# Patient Record
Sex: MALE | Race: WHITE | NOT HISPANIC OR LATINO
[De-identification: names, ages, dates, MRNs, and addresses within clinical notes are randomized per-mention and may not be internally consistent; named-entity substitution may affect disease eponyms.]

---

## 2021-11-03 ENCOUNTER — APPOINTMENT (OUTPATIENT)
Dept: SURGERY | Facility: CLINIC | Age: 51
End: 2021-11-03
Payer: COMMERCIAL

## 2021-11-03 VITALS
OXYGEN SATURATION: 97 % | BODY MASS INDEX: 31.21 KG/M2 | WEIGHT: 194.22 LBS | SYSTOLIC BLOOD PRESSURE: 128 MMHG | HEART RATE: 94 BPM | DIASTOLIC BLOOD PRESSURE: 78 MMHG | TEMPERATURE: 96.7 F | HEIGHT: 66 IN

## 2021-11-03 DIAGNOSIS — Z87.19 PERSONAL HISTORY OF OTHER DISEASES OF THE DIGESTIVE SYSTEM: ICD-10-CM

## 2021-11-03 DIAGNOSIS — G47.33 OBSTRUCTIVE SLEEP APNEA (ADULT) (PEDIATRIC): ICD-10-CM

## 2021-11-03 DIAGNOSIS — Z78.9 OTHER SPECIFIED HEALTH STATUS: ICD-10-CM

## 2021-11-03 DIAGNOSIS — Z99.89 OBSTRUCTIVE SLEEP APNEA (ADULT) (PEDIATRIC): ICD-10-CM

## 2021-11-03 DIAGNOSIS — Z86.39 PERSONAL HISTORY OF OTHER ENDOCRINE, NUTRITIONAL AND METABOLIC DISEASE: ICD-10-CM

## 2021-11-03 DIAGNOSIS — Z82.49 FAMILY HISTORY OF ISCHEMIC HEART DISEASE AND OTHER DISEASES OF THE CIRCULATORY SYSTEM: ICD-10-CM

## 2021-11-03 DIAGNOSIS — Z87.09 PERSONAL HISTORY OF OTHER DISEASES OF THE RESPIRATORY SYSTEM: ICD-10-CM

## 2021-11-03 PROCEDURE — 99204 OFFICE O/P NEW MOD 45 MIN: CPT

## 2021-11-03 RX ORDER — FLUTICASONE FUROATE, UMECLIDINIUM BROMIDE AND VILANTEROL TRIFENATATE 200; 62.5; 25 UG/1; UG/1; UG/1
POWDER RESPIRATORY (INHALATION)
Refills: 0 | Status: ACTIVE | COMMUNITY

## 2021-11-03 RX ORDER — METFORMIN HYDROCHLORIDE 625 MG/1
TABLET ORAL
Refills: 0 | Status: ACTIVE | COMMUNITY

## 2021-11-10 NOTE — HISTORY OF PRESENT ILLNESS
[de-identified] : Very pleasant gentleman who presents for General Surgery consultation regarding a longstanding, but slowly progressive neck mass.\par Mr. Javed report a first palpable, then visible fullness along the posterior aspect of his neck some three to four years ago.\par He denies sakshi pain today, but notes an ongoing sensation of "awareness" and pressure.\par With time the mass has progressed from the more central aspect of the posterior neck toward the right posterior triangle.\par He has been previously told that this is "likely a lipoma," but has not had any imaging or investigations to date regarding this issue...
Myself

## 2021-11-10 NOTE — PLAN
[FreeTextEntry1] : Grossly benign appearing but progressive and now large soft tissue lesion.  No imaging to date.  Overall clinical impression is of lipoma versus cyst.  However, extending into posterior triangle/ region of Spinal Accessory nerve.\par Would recommend ultrasound to evaluate for cystic collection or lesion versus lipoma.  Given extension into deeper SQ tissue planes, can consider MRI to further delineate underlying anatomy and any extension into/ proximity to more critical structures.  All above discussed with patient in detail.  Given these considerations and the non-emergent  nature of this intervention, will refer to Surgical Oncology.  Patient given name and contact information for provider within system.  I remain available for any interval questions or difficulty in securing timely follow-up.  Mr. Javed is pleased and agrees.  He leaves in good spirits and is able to verbalize this issues and recommendation as outlined above.  Please note that more than 50% of face to face time was spent in counseling and coordination of care.

## 2021-11-10 NOTE — PHYSICAL EXAM
[Respiratory Effort] : normal respiratory effort [Normal Rate and Rhythm] : normal rate and rhythm [No HSM] : no hepatosplenomegaly [Tender] : was nontender [Enlarged] : not enlarged [Alert] : alert [Oriented to Person] : oriented to person [Oriented to Place] : oriented to place [Oriented to Time] : oriented to time [Anxious] : anxious [de-identified] : Appears well, no acute distress, ambulates easily into office and assumes examination table without need of assistance.  [de-identified] : Normocephalic and atraumatic.  [de-identified] : Supple with full range of motion; see skin exam. [FreeTextEntry1] : No cervical, supraclavicular, axillary or inguinal adenopathy.  [de-identified] : No visible lesions or palpable masses.  [de-identified] : Soft, non tense and non tender. [de-identified] : Normal external genitalia. [de-identified] : Deferred.  [de-identified] : Grossly symmetric and within normal limits without motor or sensory deficits.  [de-identified] : Soft, rubbery, but not fully mobile soft tissue mass ~ 8 cm grossly along posterior cervical region extending onto/ into right posterior triangle.  Suggestive grossly of lipoma versus cyst, but not fully able to elevate from deeper SQ/ soft tissue planes. [de-identified] : though appropriately so...

## 2021-11-10 NOTE — REVIEW OF SYSTEMS
[Feeling Poorly] : not feeling poorly [Feeling Tired] : not feeling tired [As Noted in HPI] : as noted in HPI [Anxiety] : anxiety [Depression] : no depression [Negative] : Heme/Lymph [de-identified] : regarding this issue and visit...

## 2021-11-15 ENCOUNTER — NON-APPOINTMENT (OUTPATIENT)
Age: 51
End: 2021-11-15

## 2021-11-18 ENCOUNTER — APPOINTMENT (OUTPATIENT)
Dept: SURGICAL ONCOLOGY | Facility: CLINIC | Age: 51
End: 2021-11-18
Payer: COMMERCIAL

## 2021-11-18 ENCOUNTER — NON-APPOINTMENT (OUTPATIENT)
Age: 51
End: 2021-11-18

## 2021-11-18 VITALS
HEIGHT: 66 IN | BODY MASS INDEX: 30.7 KG/M2 | SYSTOLIC BLOOD PRESSURE: 139 MMHG | RESPIRATION RATE: 15 BRPM | WEIGHT: 191 LBS | TEMPERATURE: 97.8 F | OXYGEN SATURATION: 98 % | DIASTOLIC BLOOD PRESSURE: 85 MMHG | HEART RATE: 86 BPM

## 2021-11-18 PROCEDURE — 99204 OFFICE O/P NEW MOD 45 MIN: CPT

## 2021-11-18 RX ORDER — DIAZEPAM 5 MG/1
5 TABLET ORAL
Qty: 3 | Refills: 0 | Status: ACTIVE | COMMUNITY
Start: 2021-11-18 | End: 1900-01-01

## 2021-11-18 NOTE — HISTORY OF PRESENT ILLNESS
[de-identified] : Juno is a pleasant 51 year-old male here for an initial consultation.  He reports noting fullness along the right posterior aspect of his neck approximately 3-4 years ago.  He believes he had imaging for this in the past with no worrisome findings.  He reports associated pressure but no pain.  Over time the mass seems to have enlarged prompting him to seek medical advice.  He also reports noting a small soft mass on his anterior abdominal wall, also approximately 3 years ago with no interval growth.  \par \par His past medical history includes metformin, asthma (controlled), hyperlipidemia, GERD, IVANA (CPAP).  Past surgical history includes repair of an inguinal hernia.  He has a family history of colon cancer involving her maternal uncle.   He recently had a screening colonoscopy notable for polyps.  He denies tobacco use and drinks alcohol on occasion. \par \par He states that he is prone to developing fatty tumors and had 2 tumors removed when he was younger. \par

## 2021-11-18 NOTE — ASSESSMENT
[FreeTextEntry1] : We discussed options for the management of soft tissue masses.  I discussed the need for an MRI of the soft tissue of the skull.  Teofilo is concerned about claustrophobia, but is willing to try undergoing the MRI with anxiolytics.  He will contact me upon completion of the imaging to discuss pertinent findings and plan for surgical resection of the soft tissue mass.  The abdominal mass may be a another lipoma or a central hernia with preperitoneal fat.  Juno is in agreement with further evaluation of that in the future.

## 2021-11-18 NOTE — CONSULT LETTER
[DrAshley  ___] : Dr. TANG [Dear  ___] : Dear ~CHANDLER, [Consult Letter:] : I had the pleasure of evaluating your patient, [unfilled]. [Please see my note below.] : Please see my note below. [Consult Closing:] : Thank you very much for allowing me to participate in the care of this patient.  If you have any questions, please do not hesitate to contact me. [Sincerely,] : Sincerely, [FreeTextEntry2] : Vitaly Tinoco MD [FreeTextEntry3] : Ramiro Celestin MD\par Surgical Oncology\par [facility]\par Office: [phone]\par Cell: [phone]\par

## 2022-05-28 ENCOUNTER — EMERGENCY (EMERGENCY)
Facility: HOSPITAL | Age: 52
LOS: 1 days | Discharge: HOME | End: 2022-05-28
Attending: EMERGENCY MEDICINE
Payer: COMMERCIAL

## 2022-05-28 VITALS
DIASTOLIC BLOOD PRESSURE: 105 MMHG | SYSTOLIC BLOOD PRESSURE: 174 MMHG | HEART RATE: 105 BPM | TEMPERATURE: 98 F | WEIGHT: 184.97 LBS | HEIGHT: 66 IN | RESPIRATION RATE: 18 BRPM | OXYGEN SATURATION: 99 %

## 2022-05-28 LAB
ALBUMIN SERPL ELPH-MCNC: 5 G/DL — SIGNIFICANT CHANGE UP (ref 3.3–5)
ALP SERPL-CCNC: 88 U/L — SIGNIFICANT CHANGE UP (ref 40–120)
ALT FLD-CCNC: 45 U/L — SIGNIFICANT CHANGE UP (ref 10–45)
ANION GAP SERPL CALC-SCNC: 14 MMOL/L — SIGNIFICANT CHANGE UP (ref 5–17)
AST SERPL-CCNC: 25 U/L — SIGNIFICANT CHANGE UP (ref 10–40)
BASOPHILS # BLD AUTO: 0.04 K/UL — SIGNIFICANT CHANGE UP (ref 0–0.2)
BASOPHILS NFR BLD AUTO: 0.4 % — SIGNIFICANT CHANGE UP (ref 0–2)
BILIRUB SERPL-MCNC: 0.6 MG/DL — SIGNIFICANT CHANGE UP (ref 0.2–1.2)
BUN SERPL-MCNC: 13 MG/DL — SIGNIFICANT CHANGE UP (ref 7–23)
CALCIUM SERPL-MCNC: 9.9 MG/DL — SIGNIFICANT CHANGE UP (ref 8.4–10.5)
CHLORIDE SERPL-SCNC: 103 MMOL/L — SIGNIFICANT CHANGE UP (ref 96–108)
CO2 SERPL-SCNC: 26 MMOL/L — SIGNIFICANT CHANGE UP (ref 22–31)
CREAT SERPL-MCNC: 0.8 MG/DL — SIGNIFICANT CHANGE UP (ref 0.5–1.3)
EGFR: 106 ML/MIN/1.73M2 — SIGNIFICANT CHANGE UP
EOSINOPHIL # BLD AUTO: 0.09 K/UL — SIGNIFICANT CHANGE UP (ref 0–0.5)
EOSINOPHIL NFR BLD AUTO: 0.8 % — SIGNIFICANT CHANGE UP (ref 0–6)
GLUCOSE SERPL-MCNC: 137 MG/DL — HIGH (ref 70–99)
HCT VFR BLD CALC: 44.5 % — SIGNIFICANT CHANGE UP (ref 39–50)
HGB BLD-MCNC: 14.5 G/DL — SIGNIFICANT CHANGE UP (ref 13–17)
IMM GRANULOCYTES NFR BLD AUTO: 0.9 % — SIGNIFICANT CHANGE UP (ref 0–1.5)
LYMPHOCYTES # BLD AUTO: 1.92 K/UL — SIGNIFICANT CHANGE UP (ref 1–3.3)
LYMPHOCYTES # BLD AUTO: 18 % — SIGNIFICANT CHANGE UP (ref 13–44)
MCHC RBC-ENTMCNC: 28 PG — SIGNIFICANT CHANGE UP (ref 27–34)
MCHC RBC-ENTMCNC: 32.6 GM/DL — SIGNIFICANT CHANGE UP (ref 32–36)
MCV RBC AUTO: 86.1 FL — SIGNIFICANT CHANGE UP (ref 80–100)
MONOCYTES # BLD AUTO: 0.84 K/UL — SIGNIFICANT CHANGE UP (ref 0–0.9)
MONOCYTES NFR BLD AUTO: 7.9 % — SIGNIFICANT CHANGE UP (ref 2–14)
NEUTROPHILS # BLD AUTO: 7.69 K/UL — HIGH (ref 1.8–7.4)
NEUTROPHILS NFR BLD AUTO: 72 % — SIGNIFICANT CHANGE UP (ref 43–77)
NRBC # BLD: 0 /100 WBCS — SIGNIFICANT CHANGE UP (ref 0–0)
PLATELET # BLD AUTO: 180 K/UL — SIGNIFICANT CHANGE UP (ref 150–400)
POTASSIUM SERPL-MCNC: 3.9 MMOL/L — SIGNIFICANT CHANGE UP (ref 3.5–5.3)
POTASSIUM SERPL-SCNC: 3.9 MMOL/L — SIGNIFICANT CHANGE UP (ref 3.5–5.3)
PROT SERPL-MCNC: 7.4 G/DL — SIGNIFICANT CHANGE UP (ref 6–8.3)
RBC # BLD: 5.17 M/UL — SIGNIFICANT CHANGE UP (ref 4.2–5.8)
RBC # FLD: 13.4 % — SIGNIFICANT CHANGE UP (ref 10.3–14.5)
SODIUM SERPL-SCNC: 143 MMOL/L — SIGNIFICANT CHANGE UP (ref 135–145)
TROPONIN T, HIGH SENSITIVITY RESULT: 16 NG/L — SIGNIFICANT CHANGE UP (ref 0–51)
WBC # BLD: 10.68 K/UL — HIGH (ref 3.8–10.5)
WBC # FLD AUTO: 10.68 K/UL — HIGH (ref 3.8–10.5)

## 2022-05-28 PROCEDURE — 93010 ELECTROCARDIOGRAM REPORT: CPT | Mod: 59

## 2022-05-28 PROCEDURE — 99285 EMERGENCY DEPT VISIT HI MDM: CPT | Mod: 25

## 2022-05-28 PROCEDURE — 71046 X-RAY EXAM CHEST 2 VIEWS: CPT | Mod: 26

## 2022-05-28 RX ORDER — ASPIRIN/CALCIUM CARB/MAGNESIUM 324 MG
243 TABLET ORAL ONCE
Refills: 0 | Status: COMPLETED | OUTPATIENT
Start: 2022-05-28 | End: 2022-05-28

## 2022-05-28 RX ORDER — ASPIRIN/CALCIUM CARB/MAGNESIUM 324 MG
324 TABLET ORAL ONCE
Refills: 0 | Status: DISCONTINUED | OUTPATIENT
Start: 2022-05-28 | End: 2022-05-28

## 2022-05-28 RX ADMIN — Medication 243 MILLIGRAM(S): at 21:19

## 2022-05-28 NOTE — ED ADULT NURSE NOTE - OBJECTIVE STATEMENT
52 yo M pt PMHx of Diabetes takes Metformin p/w sudden onset chest pain, SOB, lightheadedness with diaphoresis while taking several trips up and down stairs. pt endorses symptoms have resolved and he took ASA 81 mg at home.   pt is A&Ox4, MAEW, PERRL, skin is warm dry intact/normal for race, LS CTA, no peripheral edema.  Pt denies headache, dizziness, current chest pain, palpitations, cough, SOB, abdominal pain, n/v/d, urinary symptoms, fevers, chills, weakness at this time.

## 2022-05-28 NOTE — ED PROVIDER NOTE - CLINICAL SUMMARY MEDICAL DECISION MAKING FREE TEXT BOX
50 y/o M with PMH DM presenting to the ED with CP. Vitals stable. Patient well appearing in NAD on exam. Concern for ACS given suspicious story. Plan for labs, CXR, EKG. Likely CDU for stress in setting of negative workup. Arash Miranda, DO PGY3

## 2022-05-28 NOTE — ED PROVIDER NOTE - ATTENDING CONTRIBUTION TO CARE
Daquan Llamas MD:   I personally saw the patient and performed a substantive portion of the visit including all aspects of the medical decision making.    MDM: Chest pain with lightheadedness, radiation to the L arm and diaphoresis, concerning Hx for ACS. Asymptomatic at this time, duration about 15-20 min. EKG does not show evidence of ST elevations, minimal depression noted. Breath sounds symmetric, not likely to be pneumothorax or pneumonia. No signs or symptoms concerning for pulmonary embolism or aortic dissection. Will obtain EKG, CXR and labs including troponin. Will keep patient on cardiac monitor. Will Tx w/ antiplatelet load.

## 2022-05-28 NOTE — ED PROVIDER NOTE - NSFOLLOWUPINSTRUCTIONS_ED_ALL_ED_FT
Please follow up with your cardiologist this week.    Chest pain can be caused by many different conditions which may or may not be dangerous. Causes include heartburn, lung infections, heart attack, blood clot in lungs, skin infections, strain or damage to muscle, cartilage, or bones, etc. In addition to a history and physical examination, an electrocardiogram (ECG) or other lab tests may have been performed to determine the cause of your chest pain. Follow up with your primary care provider or with a cardiologist as instructed.     SEEK IMMEDIATE MEDICAL CARE IF YOU HAVE ANY OF THE FOLLOWING SYMPTOMS: worsening chest pain, coughing up blood, unexplained back/neck/jaw pain, severe abdominal pain, dizziness or lightheadedness, fainting, shortness of breath, sweaty or clammy skin, vomiting, or racing heart beat. These symptoms may represent a serious problem that is an emergency. Do not wait to see if the symptoms will go away. Get medical help right away. Call 911 and do not drive yourself to the hospital.

## 2022-05-28 NOTE — ED PROVIDER NOTE - OBJECTIVE STATEMENT
52 y/o M with PMH DM presenting to the ED with CP. Patient states he was lifting a lightweight object around 6pm when he began to experience significant left sided chest pain which radiated to the L jaw and left arm. Reports he felt diaphoretic and lightheaded at that time. The chest pain resolved but he has some residual pain in his jaw and L arm. He denies SOB. No N/V, fever, or chills. Reports having a recent stress test that was normal within the past year. No family hx heart disease.

## 2022-05-28 NOTE — ED PROVIDER NOTE - PATIENT PORTAL LINK FT
You can access the FollowMyHealth Patient Portal offered by [facility] by registering at the following website: [email or web address]. By joining [email or web address] FollowMyHealth portal, you will also be able to view your health information using other applications (apps) compatible with our system.

## 2022-05-28 NOTE — ED PROVIDER NOTE - PROGRESS NOTE DETAILS
Rpt trop stable. Patient does not want to stay in CDU for stress. Will sign out AMA and follow up with cardiology in the AM. Arash Miranda, DO PGY3 Rpt trop stable. Patient does not want to stay in CDU for stress. Understands risks of leaving against medical advice and has capacity. Will sign out AMA and follow up with cardiology in the AM. Arash Miranda, DO PGY3

## 2022-05-29 VITALS
SYSTOLIC BLOOD PRESSURE: 142 MMHG | HEART RATE: 82 BPM | TEMPERATURE: 98 F | DIASTOLIC BLOOD PRESSURE: 82 MMHG | OXYGEN SATURATION: 100 % | RESPIRATION RATE: 16 BRPM

## 2022-05-29 LAB — TROPONIN T, HIGH SENSITIVITY RESULT: 17 NG/L — SIGNIFICANT CHANGE UP (ref 0–51)

## 2022-05-29 PROCEDURE — 84484 ASSAY OF TROPONIN QUANT: CPT

## 2022-05-29 PROCEDURE — 80053 COMPREHEN METABOLIC PANEL: CPT

## 2022-05-29 PROCEDURE — 99285 EMERGENCY DEPT VISIT HI MDM: CPT | Mod: 25

## 2022-05-29 PROCEDURE — 85025 COMPLETE CBC W/AUTO DIFF WBC: CPT

## 2022-05-29 PROCEDURE — 36415 COLL VENOUS BLD VENIPUNCTURE: CPT

## 2022-05-29 PROCEDURE — 71046 X-RAY EXAM CHEST 2 VIEWS: CPT

## 2022-05-29 PROCEDURE — 93005 ELECTROCARDIOGRAM TRACING: CPT

## 2022-06-02 ENCOUNTER — TRANSCRIPTION ENCOUNTER (OUTPATIENT)
Age: 52
End: 2022-06-02

## 2022-07-25 ENCOUNTER — NON-APPOINTMENT (OUTPATIENT)
Age: 52
End: 2022-07-25

## 2022-07-25 ENCOUNTER — APPOINTMENT (OUTPATIENT)
Dept: ELECTROPHYSIOLOGY | Facility: CLINIC | Age: 52
End: 2022-07-25

## 2022-07-25 VITALS — SYSTOLIC BLOOD PRESSURE: 159 MMHG | HEART RATE: 87 BPM | OXYGEN SATURATION: 98 % | DIASTOLIC BLOOD PRESSURE: 92 MMHG

## 2022-07-25 DIAGNOSIS — I10 ESSENTIAL (PRIMARY) HYPERTENSION: ICD-10-CM

## 2022-07-25 DIAGNOSIS — R00.2 PALPITATIONS: ICD-10-CM

## 2022-07-25 DIAGNOSIS — Z99.89 OBSTRUCTIVE SLEEP APNEA (ADULT) (PEDIATRIC): ICD-10-CM

## 2022-07-25 DIAGNOSIS — E78.5 HYPERLIPIDEMIA, UNSPECIFIED: ICD-10-CM

## 2022-07-25 DIAGNOSIS — G47.33 OBSTRUCTIVE SLEEP APNEA (ADULT) (PEDIATRIC): ICD-10-CM

## 2022-07-25 PROCEDURE — 93000 ELECTROCARDIOGRAM COMPLETE: CPT

## 2022-07-25 PROCEDURE — 99205 OFFICE O/P NEW HI 60 MIN: CPT | Mod: 25

## 2022-07-25 RX ORDER — FLUTICASONE PROPIONATE AND SALMETEROL 50; 250 UG/1; UG/1
250-50 POWDER RESPIRATORY (INHALATION)
Qty: 60 | Refills: 0 | Status: ACTIVE | COMMUNITY
Start: 2022-05-16

## 2022-07-25 RX ORDER — ROSUVASTATIN CALCIUM 10 MG/1
10 TABLET, FILM COATED ORAL
Qty: 90 | Refills: 0 | Status: ACTIVE | COMMUNITY
Start: 2022-04-22

## 2022-07-25 RX ORDER — ROSUVASTATIN CALCIUM 5 MG/1
TABLET, FILM COATED ORAL
Refills: 0 | Status: DISCONTINUED | COMMUNITY
End: 2022-07-25

## 2022-07-25 RX ORDER — NIRMATRELVIR AND RITONAVIR 300-100 MG
20 X 150 MG & KIT ORAL
Qty: 30 | Refills: 0 | Status: DISCONTINUED | COMMUNITY
Start: 2022-06-22

## 2022-07-25 RX ORDER — FLUTICASONE PROPION/SALMETEROL 250-50 MCG
250-50 BLISTER, WITH INHALATION DEVICE INHALATION
Refills: 0 | Status: DISCONTINUED | COMMUNITY
End: 2022-07-25

## 2022-07-25 RX ORDER — PANTOPRAZOLE 40 MG/1
40 TABLET, DELAYED RELEASE ORAL
Qty: 90 | Refills: 0 | Status: ACTIVE | COMMUNITY
Start: 2021-11-11

## 2022-07-25 RX ORDER — ALFUZOSIN HYDROCHLORIDE 10 MG/1
10 TABLET, EXTENDED RELEASE ORAL
Qty: 30 | Refills: 0 | Status: ACTIVE | COMMUNITY
Start: 2022-02-15

## 2022-07-25 NOTE — HISTORY OF PRESENT ILLNESS
[FreeTextEntry1] : Juno Javed is a 53y/o man with Hx of HTN, HLD, IVANA on CPAP, BPH, DMII, asthma, and recurrent symptoms of palpitations who presents today for initial evaluation. Admits recurrent episodes of palpitations with noted HRs fluctuating from 50s to the 200s. Episodes can last up to 4 hours in duration and leave him feeling fatigue. Notes feeling both palpitations as well as lightheaded during. Captures readings on his Apple Watch. No EKGs. No recent Holters. Did have an ECHO which was reportedly normal. Episodes occur every 2 weeks if not more frequently. Not on AC. Denies chest pain, SOB, syncope or near syncope.\par

## 2022-09-23 ENCOUNTER — NON-APPOINTMENT (OUTPATIENT)
Age: 52
End: 2022-09-23

## 2022-10-08 ENCOUNTER — APPOINTMENT (OUTPATIENT)
Dept: ULTRASOUND IMAGING | Facility: CLINIC | Age: 52
End: 2022-10-08

## 2022-10-08 ENCOUNTER — OUTPATIENT (OUTPATIENT)
Dept: OUTPATIENT SERVICES | Facility: HOSPITAL | Age: 52
LOS: 1 days | End: 2022-10-08
Payer: COMMERCIAL

## 2022-10-08 DIAGNOSIS — R22.2 LOCALIZED SWELLING, MASS AND LUMP, TRUNK: ICD-10-CM

## 2022-10-08 DIAGNOSIS — D48.7 NEOPLASM OF UNCERTAIN BEHAVIOR OF OTHER SPECIFIED SITES: ICD-10-CM

## 2022-10-08 DIAGNOSIS — Z00.8 ENCOUNTER FOR OTHER GENERAL EXAMINATION: ICD-10-CM

## 2022-10-08 PROCEDURE — 76536 US EXAM OF HEAD AND NECK: CPT

## 2022-10-08 PROCEDURE — 76705 ECHO EXAM OF ABDOMEN: CPT | Mod: 26

## 2022-10-08 PROCEDURE — 76705 ECHO EXAM OF ABDOMEN: CPT

## 2022-10-08 PROCEDURE — 76536 US EXAM OF HEAD AND NECK: CPT | Mod: 26

## 2022-10-12 ENCOUNTER — NON-APPOINTMENT (OUTPATIENT)
Age: 52
End: 2022-10-12

## 2022-10-17 ENCOUNTER — APPOINTMENT (OUTPATIENT)
Dept: SURGICAL ONCOLOGY | Facility: CLINIC | Age: 52
End: 2022-10-17

## 2022-10-17 VITALS
TEMPERATURE: 97.7 F | DIASTOLIC BLOOD PRESSURE: 81 MMHG | OXYGEN SATURATION: 98 % | SYSTOLIC BLOOD PRESSURE: 134 MMHG | RESPIRATION RATE: 16 BRPM | HEIGHT: 66 IN | HEART RATE: 88 BPM | BODY MASS INDEX: 30.7 KG/M2 | WEIGHT: 191 LBS

## 2022-10-17 DIAGNOSIS — R22.2 LOCALIZED SWELLING, MASS AND LUMP, TRUNK: ICD-10-CM

## 2022-10-17 PROCEDURE — 99214 OFFICE O/P EST MOD 30 MIN: CPT

## 2022-10-17 NOTE — ASSESSMENT
[FreeTextEntry1] : We discussed the plan for surgical excision of the right posterior neck soft tissue mass.  We discussed the risks, benefits and alternatives of the procedure.  We also discussed post operative expectations and possible complications.  He expresses understanding and agrees to proceed.\par \colt Fraire would like to hold off on the periumbilical mass until he is healed and recovered from the neck procedure.

## 2022-10-17 NOTE — HISTORY OF PRESENT ILLNESS
[de-identified] : Juno is a pleasant 52 year-old male seen in November 2021 for a soft tissue mass of the right posterior neck.  \par \par He noted fullness along the right posterior aspect of his neck approximately 4-5 years ago.  He believes he had imaging for this in the past with no worrisome findings.  He reports associated pressure but no pain.  Over time the mass seems to have enlarged prompting him to seek medical advice.  He also reports noting a small soft mass on his anterior abdominal wall, also approximately 3 years ago with no interval growth.  \par \par His past medical history includes metformin, asthma (controlled), hyperlipidemia, GERD, IVANA (CPAP).  Past surgical history includes repair of an inguinal hernia.  He has a family history of colon cancer involving her maternal uncle.   He recently had a screening colonoscopy notable for polyps.  He denies tobacco use and drinks alcohol on occasion. \par \par He states that he is prone to developing fatty tumors and had 2 tumors removed when he was younger. \par \par 10/17/22- Juno had extreme difficulty with concerns of claustrophobia precluding MRI evaluation.  He did undergo an US of the head/neck and Abdomen on 10/8/2022. The head/neck US revealed a 6 cm palpable circumscribed mass in the right posterior neck soft tissues, question lipoma. Abdominal US reveals a palpable 3.6 cm lobulated hypoechoic structure in the midline supraumbilical abdominal wall at the level of the musculature is indeterminate. Differential includes, but is not exclusive to, a soft tissue mass and abdominal wall hernia. Juno feels that additional imaging will be extremely difficult due to his claustrophobia.\par

## 2022-10-17 NOTE — CONSULT LETTER
[Dear  ___] : Dear ~CHANDLER, [Consult Letter:] : I had the pleasure of evaluating your patient, [unfilled]. [Please see my note below.] : Please see my note below. [Consult Closing:] : Thank you very much for allowing me to participate in the care of this patient.  If you have any questions, please do not hesitate to contact me. [Sincerely,] : Sincerely, [FreeTextEntry2] : Vitaly Tinoco MD  [FreeTextEntry3] : Ramiro Celestin MD\par Surgical Oncology\par [facility]\par Office: [phone]\par Cell: [phone]\par  [DrAshley  ___] : Dr. TANG

## 2022-10-17 NOTE — PHYSICAL EXAM
[Normal Neck Lymph Nodes] : normal neck lymph nodes  [Normal Supraclavicular Lymph Nodes] : normal supraclavicular lymph nodes [Normal Axillary Lymph Nodes] : normal axillary lymph nodes [Normal] : oriented to person, place and time, with appropriate affect [de-identified] : right posterior neck approximately 5-6 cm soft circumscribed subcutaneous mass [de-identified] : small approximately 3 cm soft circumscribed subcutaneous mass mid anterior abdominal wall.  No change with position or Valsalva maneuvers.

## 2022-11-01 ENCOUNTER — OUTPATIENT (OUTPATIENT)
Dept: OUTPATIENT SERVICES | Facility: HOSPITAL | Age: 52
LOS: 1 days | End: 2022-11-01

## 2022-11-01 VITALS
HEIGHT: 67.5 IN | DIASTOLIC BLOOD PRESSURE: 84 MMHG | SYSTOLIC BLOOD PRESSURE: 144 MMHG | RESPIRATION RATE: 16 BRPM | HEART RATE: 79 BPM | TEMPERATURE: 98 F | OXYGEN SATURATION: 97 % | WEIGHT: 184.09 LBS

## 2022-11-01 DIAGNOSIS — E11.9 TYPE 2 DIABETES MELLITUS WITHOUT COMPLICATIONS: ICD-10-CM

## 2022-11-01 DIAGNOSIS — Z98.890 OTHER SPECIFIED POSTPROCEDURAL STATES: Chronic | ICD-10-CM

## 2022-11-01 DIAGNOSIS — R22.2 LOCALIZED SWELLING, MASS AND LUMP, TRUNK: ICD-10-CM

## 2022-11-01 DIAGNOSIS — J45.909 UNSPECIFIED ASTHMA, UNCOMPLICATED: ICD-10-CM

## 2022-11-01 DIAGNOSIS — G47.33 OBSTRUCTIVE SLEEP APNEA (ADULT) (PEDIATRIC): ICD-10-CM

## 2022-11-01 LAB
A1C WITH ESTIMATED AVERAGE GLUCOSE RESULT: 6.5 % — HIGH (ref 4–5.6)
ANION GAP SERPL CALC-SCNC: 11 MMOL/L — SIGNIFICANT CHANGE UP (ref 7–14)
BUN SERPL-MCNC: 12 MG/DL — SIGNIFICANT CHANGE UP (ref 7–23)
CALCIUM SERPL-MCNC: 9.9 MG/DL — SIGNIFICANT CHANGE UP (ref 8.4–10.5)
CHLORIDE SERPL-SCNC: 101 MMOL/L — SIGNIFICANT CHANGE UP (ref 98–107)
CO2 SERPL-SCNC: 27 MMOL/L — SIGNIFICANT CHANGE UP (ref 22–31)
CREAT SERPL-MCNC: 0.69 MG/DL — SIGNIFICANT CHANGE UP (ref 0.5–1.3)
EGFR: 110 ML/MIN/1.73M2 — SIGNIFICANT CHANGE UP
ESTIMATED AVERAGE GLUCOSE: 140 — SIGNIFICANT CHANGE UP
GLUCOSE SERPL-MCNC: 117 MG/DL — HIGH (ref 70–99)
HCT VFR BLD CALC: 43.9 % — SIGNIFICANT CHANGE UP (ref 39–50)
HGB BLD-MCNC: 14.3 G/DL — SIGNIFICANT CHANGE UP (ref 13–17)
MCHC RBC-ENTMCNC: 27.9 PG — SIGNIFICANT CHANGE UP (ref 27–34)
MCHC RBC-ENTMCNC: 32.6 GM/DL — SIGNIFICANT CHANGE UP (ref 32–36)
MCV RBC AUTO: 85.6 FL — SIGNIFICANT CHANGE UP (ref 80–100)
NRBC # BLD: 0 /100 WBCS — SIGNIFICANT CHANGE UP (ref 0–0)
NRBC # FLD: 0 K/UL — SIGNIFICANT CHANGE UP (ref 0–0)
PLATELET # BLD AUTO: 183 K/UL — SIGNIFICANT CHANGE UP (ref 150–400)
POTASSIUM SERPL-MCNC: 4 MMOL/L — SIGNIFICANT CHANGE UP (ref 3.5–5.3)
POTASSIUM SERPL-SCNC: 4 MMOL/L — SIGNIFICANT CHANGE UP (ref 3.5–5.3)
RBC # BLD: 5.13 M/UL — SIGNIFICANT CHANGE UP (ref 4.2–5.8)
RBC # FLD: 13.4 % — SIGNIFICANT CHANGE UP (ref 10.3–14.5)
SODIUM SERPL-SCNC: 139 MMOL/L — SIGNIFICANT CHANGE UP (ref 135–145)
WBC # BLD: 8.59 K/UL — SIGNIFICANT CHANGE UP (ref 3.8–10.5)
WBC # FLD AUTO: 8.59 K/UL — SIGNIFICANT CHANGE UP (ref 3.8–10.5)

## 2022-11-01 PROCEDURE — 93010 ELECTROCARDIOGRAM REPORT: CPT

## 2022-11-01 RX ORDER — SODIUM CHLORIDE 9 MG/ML
1000 INJECTION, SOLUTION INTRAVENOUS
Refills: 0 | Status: DISCONTINUED | OUTPATIENT
Start: 2022-11-16 | End: 2022-11-30

## 2022-11-01 RX ORDER — SODIUM CHLORIDE 9 MG/ML
3 INJECTION INTRAMUSCULAR; INTRAVENOUS; SUBCUTANEOUS EVERY 8 HOURS
Refills: 0 | Status: DISCONTINUED | OUTPATIENT
Start: 2022-11-16 | End: 2022-11-30

## 2022-11-01 NOTE — H&P PST ADULT - MALLAMPATI CLASS
Class IV (difficult) - the soft palate is not visible at all Abdomen soft, non-tender, no guarding.  mild right CVA TTP vs soft tissue TTP.

## 2022-11-01 NOTE — H&P PST ADULT - NSICDXFAMILYHX_GEN_ALL_CORE_FT
FAMILY HISTORY:  Father  Still living? No  FH: heart disease, Age at diagnosis: Age Unknown    Mother  Still living? Yes, Estimated age: Age Unknown  FH: heart disease, Age at diagnosis: Age Unknown    Sibling  Still living? Yes, Estimated age: Age Unknown  Family history of kidney transplant, Age at diagnosis: Age Unknown    Uncle  Still living? No  FH: colon cancer, Age at diagnosis: Age Unknown

## 2022-11-01 NOTE — H&P PST ADULT - PROBLEM SELECTOR PLAN 1
Patient scheduled for surgery on: 11/16/22  Patient provided with verbal and written presurgical instructions; verbalized understanding  with teach back on the following : Protonix for GI prophylaxis;  and  Chlorhexidine wash  Patient reminded to obtain Preop COVID PCR, lab directory provided

## 2022-11-01 NOTE — H&P PST ADULT - ATTENDING COMMENTS
D/w pt plan for resection of posterior scalp mass    Discussed r/b/a post op expectations poss complications.      Pt understands and agrees to proceed.

## 2022-11-01 NOTE — H&P PST ADULT - PROBLEM SELECTOR PLAN 4
Patient instructed on medications adjustments: hold metformin DOS   POCT glucose testing upon admission

## 2022-11-01 NOTE — H&P PST ADULT - HISTORY OF PRESENT ILLNESS
52 yr old male presents with preop dx localized swelling mass and lump scheduled for resection of neck mass

## 2022-11-01 NOTE — H&P PST ADULT - NSICDXPASTMEDICALHX_GEN_ALL_CORE_FT
PAST MEDICAL HISTORY:  Asthma     DM (diabetes mellitus)     GERD (gastroesophageal reflux disease)     HLD (hyperlipidemia)     Localized swelling, mass and lump, trunk

## 2022-11-15 ENCOUNTER — TRANSCRIPTION ENCOUNTER (OUTPATIENT)
Age: 52
End: 2022-11-15

## 2022-11-16 ENCOUNTER — OUTPATIENT (OUTPATIENT)
Dept: OUTPATIENT SERVICES | Facility: HOSPITAL | Age: 52
LOS: 1 days | Discharge: HOME | End: 2022-11-16

## 2022-11-16 ENCOUNTER — TRANSCRIPTION ENCOUNTER (OUTPATIENT)
Age: 52
End: 2022-11-16

## 2022-11-16 ENCOUNTER — APPOINTMENT (OUTPATIENT)
Dept: SURGICAL ONCOLOGY | Facility: HOSPITAL | Age: 52
End: 2022-11-16

## 2022-11-16 ENCOUNTER — RESULT REVIEW (OUTPATIENT)
Age: 52
End: 2022-11-16

## 2022-11-16 VITALS
HEART RATE: 83 BPM | RESPIRATION RATE: 16 BRPM | TEMPERATURE: 99 F | OXYGEN SATURATION: 100 % | HEIGHT: 67.5 IN | DIASTOLIC BLOOD PRESSURE: 79 MMHG | WEIGHT: 184.09 LBS | SYSTOLIC BLOOD PRESSURE: 151 MMHG

## 2022-11-16 VITALS
OXYGEN SATURATION: 96 % | HEART RATE: 82 BPM | DIASTOLIC BLOOD PRESSURE: 84 MMHG | SYSTOLIC BLOOD PRESSURE: 146 MMHG | RESPIRATION RATE: 16 BRPM | TEMPERATURE: 98 F

## 2022-11-16 DIAGNOSIS — Z98.890 OTHER SPECIFIED POSTPROCEDURAL STATES: Chronic | ICD-10-CM

## 2022-11-16 DIAGNOSIS — R22.2 LOCALIZED SWELLING, MASS AND LUMP, TRUNK: ICD-10-CM

## 2022-11-16 LAB — GLUCOSE BLDC GLUCOMTR-MCNC: 104 MG/DL — HIGH (ref 70–99)

## 2022-11-16 PROCEDURE — 21014 EXC FACE TUM DEEP 2 CM/>: CPT

## 2022-11-16 PROCEDURE — 88304 TISSUE EXAM BY PATHOLOGIST: CPT | Mod: 26

## 2022-11-16 RX ORDER — PANTOPRAZOLE SODIUM 20 MG/1
1 TABLET, DELAYED RELEASE ORAL
Qty: 0 | Refills: 0 | DISCHARGE

## 2022-11-16 RX ORDER — HYDROMORPHONE HYDROCHLORIDE 2 MG/ML
0.5 INJECTION INTRAMUSCULAR; INTRAVENOUS; SUBCUTANEOUS
Refills: 0 | Status: DISCONTINUED | OUTPATIENT
Start: 2022-11-16 | End: 2022-11-16

## 2022-11-16 RX ORDER — FLUTICASONE FUROATE, UMECLIDINIUM BROMIDE AND VILANTEROL TRIFENATATE 200; 62.5; 25 UG/1; UG/1; UG/1
1 POWDER RESPIRATORY (INHALATION)
Qty: 0 | Refills: 0 | DISCHARGE

## 2022-11-16 RX ORDER — ROSUVASTATIN CALCIUM 5 MG/1
1 TABLET ORAL
Qty: 0 | Refills: 0 | DISCHARGE

## 2022-11-16 RX ORDER — OXYCODONE HYDROCHLORIDE 5 MG/1
5 TABLET ORAL ONCE
Refills: 0 | Status: DISCONTINUED | OUTPATIENT
Start: 2022-11-16 | End: 2022-11-16

## 2022-11-16 RX ORDER — SODIUM CHLORIDE 9 MG/ML
1000 INJECTION, SOLUTION INTRAVENOUS
Refills: 0 | Status: DISCONTINUED | OUTPATIENT
Start: 2022-11-16 | End: 2022-11-30

## 2022-11-16 RX ORDER — FLUTICASONE PROPIONATE AND SALMETEROL 50; 250 UG/1; UG/1
1 POWDER ORAL; RESPIRATORY (INHALATION)
Qty: 0 | Refills: 0 | DISCHARGE

## 2022-11-16 RX ORDER — ERGOCALCIFEROL 1.25 MG/1
1 CAPSULE ORAL
Qty: 0 | Refills: 0 | DISCHARGE

## 2022-11-16 RX ORDER — METFORMIN HYDROCHLORIDE 850 MG/1
1 TABLET ORAL
Qty: 0 | Refills: 0 | DISCHARGE

## 2022-11-16 RX ORDER — ONDANSETRON 8 MG/1
4 TABLET, FILM COATED ORAL ONCE
Refills: 0 | Status: DISCONTINUED | OUTPATIENT
Start: 2022-11-16 | End: 2022-11-30

## 2022-11-16 NOTE — BRIEF OPERATIVE NOTE - NSICDXBRIEFPROCEDURE_GEN_ALL_CORE_FT
PROCEDURES:  Surgical removal of subcutaneous neoplasm of soft tissue of scalp, 2 cm or greater 16-Nov-2022 13:26:18  Korin Montgomery

## 2022-11-16 NOTE — ASU DISCHARGE PLAN (ADULT/PEDIATRIC) - DO NOT TAKE THE STERI STRIPS OFF
From: Abril Contreras  To: Oli Peterson MD  Sent: 7/25/2019 9:45 AM CDT  Subject: Other    Ngozi-    Wondering if the request for the MRI of the cervical spine was put in the computer. No one called me yesterday and i dont see it under upcoming tests/procedures on my [place] advocate. And dont know if the MRI needs prior authorization??    Would also appreciate it if i could get a referral to see Dr. Cullen Loving for the shoulder, scapular, elbow and forearm pain (i have seen him in the past but not for this)? I really do not want to wait for the results of the MRI as it may take a while to get in to see him, and i am having a hard time dealing with all this arm pain. If the MRI shows something, then i can always cancel with him.    Thanks-   Delmy Contreras  
Statement Selected

## 2022-11-16 NOTE — ASU DISCHARGE PLAN (ADULT/PEDIATRIC) - NURSING INSTRUCTIONS
Last dose of TYLENOL for pain management was at 1:00PM. Next dose of TYLENOL may be taken at or after 7:30PM if needed. DO NOT take any additional products containing TYLENOL or ACETAMINOPHEN, such as VICODIN, PERCOCET, NORCO, EXCEDRIN, and any over-the-counter cold medications. DO NOT CONSUME MORE THAN 2747-0772 MG OF TYLENOL (acetaminophen) in a 24-hour period.   As part of your pain regimen, you are able to take Motrin every 6hrs as needed. You were last given IV Motrin at 1:00PM. Please do not take your next Motrin until 7:30PM.

## 2022-11-16 NOTE — ASU DISCHARGE PLAN (ADULT/PEDIATRIC) - ASU DC SPECIAL INSTRUCTIONSFT
You can shower after 24hours after surgery. Please wait for 48hours after surgery to remove dressing. Please let the steri strips fall off on their own.    You can take over the counter pain tylenol or ibuprofen for pain. Percocet has also been sent to your pharmacy for breakthrough pain.    Please follow up in clinic with Dr. Celestin in 2 weeks. You can shower after 24hours after surgery. Please wait for 48hours after surgery to remove dressing. Please let the steri strips fall off on their own.    You can take over the counter pain Tylenol or ibuprofen for pain. Percocet has also been sent to your pharmacy for breakthrough pain.    Please follow up in clinic with Dr. Celestin in 2 weeks.

## 2022-11-16 NOTE — ASU DISCHARGE PLAN (ADULT/PEDIATRIC) - CARE PROVIDER_API CALL
Ramiro Celestin)  Surgery  [address] [place] [address]  Phone: [phone]  Fax: [phone]  Follow Up Time: 2 weeks

## 2022-11-22 ENCOUNTER — APPOINTMENT (OUTPATIENT)
Dept: SURGICAL ONCOLOGY | Facility: CLINIC | Age: 52
End: 2022-11-22

## 2022-11-22 VITALS
TEMPERATURE: 97.8 F | RESPIRATION RATE: 16 BRPM | SYSTOLIC BLOOD PRESSURE: 140 MMHG | BODY MASS INDEX: 30.22 KG/M2 | OXYGEN SATURATION: 96 % | DIASTOLIC BLOOD PRESSURE: 84 MMHG | HEART RATE: 84 BPM | WEIGHT: 188 LBS | HEIGHT: 66 IN

## 2022-11-22 PROBLEM — E11.9 TYPE 2 DIABETES MELLITUS WITHOUT COMPLICATIONS: Chronic | Status: ACTIVE | Noted: 2022-11-01

## 2022-11-22 PROBLEM — G47.33 OBSTRUCTIVE SLEEP APNEA (ADULT) (PEDIATRIC): Chronic | Status: ACTIVE | Noted: 2022-11-01

## 2022-11-22 PROBLEM — R22.2 LOCALIZED SWELLING, MASS AND LUMP, TRUNK: Chronic | Status: ACTIVE | Noted: 2022-11-01

## 2022-11-22 PROBLEM — E78.5 HYPERLIPIDEMIA, UNSPECIFIED: Chronic | Status: ACTIVE | Noted: 2022-11-01

## 2022-11-22 PROBLEM — K21.9 GASTRO-ESOPHAGEAL REFLUX DISEASE WITHOUT ESOPHAGITIS: Chronic | Status: ACTIVE | Noted: 2022-11-01

## 2022-11-22 PROBLEM — J45.909 UNSPECIFIED ASTHMA, UNCOMPLICATED: Chronic | Status: ACTIVE | Noted: 2022-11-01

## 2022-11-22 PROCEDURE — 99024 POSTOP FOLLOW-UP VISIT: CPT

## 2022-11-22 NOTE — CONSULT LETTER
[Dear  ___] : Dear ~CHANDLER, [Consult Letter:] : I had the pleasure of evaluating your patient, [unfilled]. [Please see my note below.] : Please see my note below. [Consult Closing:] : Thank you very much for allowing me to participate in the care of this patient.  If you have any questions, please do not hesitate to contact me. [Sincerely,] : Sincerely, [DrAshley  ___] : Dr. TANG [FreeTextEntry2] : Vitaly Tinoco MD  [FreeTextEntry3] : Ramiro Celestin MD\par Surgical Oncology\par [facility]\par Office: [phone]\par Cell: [phone]\par

## 2022-11-22 NOTE — HISTORY OF PRESENT ILLNESS
[de-identified] : Juno is a pleasant 52 year-old male seen in November 2021 for a soft tissue mass of the right posterior neck, here for a post op visit. \par \par He noted fullness along the right posterior aspect of his neck approximately 4-5 years ago.  He believes he had imaging for this in the past with no worrisome findings.  He reports associated pressure but no pain.  Over time the mass seems to have enlarged prompting him to seek medical advice.  He also reports noting a small soft mass on his anterior abdominal wall, also approximately 3 years ago with no interval growth.  \par \par His past medical history includes metformin, asthma (controlled), hyperlipidemia, GERD, IVANA (CPAP).  Past surgical history includes repair of an inguinal hernia.  He has a family history of colon cancer involving her maternal uncle.   He recently had a screening colonoscopy notable for polyps.  He denies tobacco use and drinks alcohol on occasion. \par \par He states that he is prone to developing fatty tumors and had 2 tumors removed when he was younger. \par \par 10/17/22- Juno had extreme difficulty with concerns of claustrophobia precluding MRI evaluation.  He did undergo an US of the head/neck and Abdomen on 10/8/2022. The head/neck US revealed a 6 cm palpable circumscribed mass in the right posterior neck soft tissues, question lipoma. Abdominal US reveals a palpable 3.6 cm lobulated hypoechoic structure in the midline supraumbilical abdominal wall at the level of the musculature is indeterminate. Differential includes, but is not exclusive to, a soft tissue mass and abdominal wall hernia. Juno feels that additional imaging will be extremely difficult due to his claustrophobia.\par \par **SURGERY**\par S/p right posterior scalp mass excision on 11/16/22. Pathology pending. \par \par 11/22/2022:  Juno returns for a postop visit.  Pathology still pending.  He notes tenderness and pulling sensation involving his neck with movement.  He denies any fevers or chills.  He is slowly returning to baseline activities.

## 2022-11-22 NOTE — PHYSICAL EXAM
[Normal Neck Lymph Nodes] : normal neck lymph nodes  [Normal Supraclavicular Lymph Nodes] : normal supraclavicular lymph nodes [Normal Axillary Lymph Nodes] : normal axillary lymph nodes [Normal] : oriented to person, place and time, with appropriate affect [de-identified] : Incision healing appropriately.  No seroma.  No erythema, induration, purulence.

## 2022-11-22 NOTE — ASSESSMENT
[FreeTextEntry1] : Juno will try Motrin and Tylenol for his postoperative pain relief.  He will follow-up with me in 2 weeks.

## 2022-11-23 LAB — SURGICAL PATHOLOGY STUDY: SIGNIFICANT CHANGE UP

## 2022-12-01 PROBLEM — D48.7 NEOPLASM OF UNCERTAIN BEHAVIOR OF NECK: Status: ACTIVE | Noted: 2021-11-10

## 2022-12-01 PROBLEM — D17.9 LIPOMA: Status: ACTIVE | Noted: 2022-12-01

## 2022-12-08 ENCOUNTER — APPOINTMENT (OUTPATIENT)
Dept: SURGICAL ONCOLOGY | Facility: CLINIC | Age: 52
End: 2022-12-08

## 2022-12-08 VITALS
OXYGEN SATURATION: 99 % | HEART RATE: 73 BPM | TEMPERATURE: 98.2 F | BODY MASS INDEX: 30.22 KG/M2 | HEIGHT: 66 IN | WEIGHT: 188 LBS | SYSTOLIC BLOOD PRESSURE: 164 MMHG | DIASTOLIC BLOOD PRESSURE: 91 MMHG

## 2022-12-08 DIAGNOSIS — D17.9 BENIGN LIPOMATOUS NEOPLASM, UNSPECIFIED: ICD-10-CM

## 2022-12-08 DIAGNOSIS — D48.7 NEOPLASM OF UNCERTAIN BEHAVIOR OF OTHER SPECIFIED SITES: ICD-10-CM

## 2022-12-08 PROCEDURE — 99024 POSTOP FOLLOW-UP VISIT: CPT

## 2022-12-08 NOTE — ASSESSMENT
[FreeTextEntry1] : Juno is relieved with his results.  He will follow up in 3 months.  We will also discuss his abdominal wall lesion as well at that time.

## 2022-12-08 NOTE — PHYSICAL EXAM
[Normal Neck Lymph Nodes] : normal neck lymph nodes  [Normal Supraclavicular Lymph Nodes] : normal supraclavicular lymph nodes [Normal Axillary Lymph Nodes] : normal axillary lymph nodes [Normal] : oriented to person, place and time, with appropriate affect [de-identified] : Incision healing appropriately.  No seroma.  No erythema, induration, purulence.

## 2022-12-08 NOTE — CONSULT LETTER
[Dear  ___] : Dear ~CHANDLER, [Courtesy Letter:] : I had the pleasure of seeing your patient, [unfilled], in my office today. [Please see my note below.] : Please see my note below. [Consult Closing:] : Thank you very much for allowing me to participate in the care of this patient.  If you have any questions, please do not hesitate to contact me. [Sincerely,] : Sincerely, [DrAshley  ___] : Dr. TANG [FreeTextEntry2] : Vitaly Tinoco MD  [FreeTextEntry3] : Ramiro Celestin MD\par Surgical Oncology\par [facility]\par Office: [phone]\par Cell: [phone]\par

## 2022-12-08 NOTE — HISTORY OF PRESENT ILLNESS
[de-identified] : Juno is a pleasant 52 year-old male, initially seen in November 2021 for a soft tissue mass of the right posterior neck, here today for a post op visit. \par \par He noted fullness along the right posterior aspect of his neck approximately 4-5 years ago.  He believes he had imaging for this in the past with no worrisome findings.  He reports associated pressure but no pain.  Over time the mass seems to have enlarged prompting him to seek medical advice.  He also reports noting a small soft mass on his anterior abdominal wall, also approximately 3 years ago with no interval growth.  \par \par His past medical history includes metformin, asthma (controlled), hyperlipidemia, GERD, IVANA (CPAP).  Past surgical history includes repair of an inguinal hernia.  He has a family history of colon cancer involving her maternal uncle.   He recently had a screening colonoscopy notable for polyps.  He denies tobacco use and drinks alcohol on occasion. \par \par He states that he is prone to developing fatty tumors and had 2 tumors removed when he was younger. \par \par 10/17/22- Juno had extreme difficulty with concerns of claustrophobia precluding MRI evaluation.  He did undergo an US of the head/neck and Abdomen on 10/8/2022. The head/neck US revealed a 6 cm palpable circumscribed mass in the right posterior neck soft tissues, question lipoma. Abdominal US reveals a palpable 3.6 cm lobulated hypoechoic structure in the midline supraumbilical abdominal wall at the level of the musculature is indeterminate. Differential includes, but is not exclusive to, a soft tissue mass and abdominal wall hernia. Juno feels that additional imaging will be extremely difficult due to his claustrophobia.\par \par **SURGERY**\par S/p right posterior scalp mass excision on 11/16/22. Pathology consistent with lipoma \par \par 11/22/2022:  Juno returns for a postop visit.  Pathology still pending at time of this visit.  He notes tenderness and pulling sensation involving his neck with movement.  He denies any fevers or chills.  He is slowly returning to baseline activities.\par \par 12/8/22: Juno returns for a follow-up visit. He is doing well.  He denies any complaints.

## 2023-01-18 ENCOUNTER — APPOINTMENT (OUTPATIENT)
Dept: SURGICAL ONCOLOGY | Facility: CLINIC | Age: 53
End: 2023-01-18

## 2023-09-13 NOTE — H&P PST ADULT - NEGATIVE GENERAL GENITOURINARY SYMPTOMS
Chief Complaint   Patient presents with   • Follow-up     cholesterol   • Other     Would like to be checked for ankylosing spondylitis, & rheumatoid arthritis        HISTORY OF PRESENT ILLNESS     The patient presents today with his fiancee scheduled for follow-up of hyperlipidemia, mild hypermagnesemia, and elevated fasting glucose.  Recent fasting labs reviewed as below.    Patient has made significant lifestyle changes over the past several months.  He reports he is eating smaller portion sizes.  He is lost over 25 lb since March.  His lipid panel is now normal.  His fasting glucose is only slightly elevated.    Magnesium level continues to be mildly elevated.  Patient denies taking any magnesium supplements over-the-counter.    Today the patient is concerned about possible autoimmune disorder.  He has been following up with ophthalmology for chronic iritis of the bilateral eyes.  He reports that he has some concern that an autoimmune disorders such as ankylosing spondylitis could be a contributing factor.  Patient has a history of Raynaud's syndrome.  He also now is starting to develop some hypopigmented patches of the skin and white patches of hair/eyelashes consistent with vitiligo.  He reports he chronically has some intermittent generalized joint pains, sometimes in the knees, ankles, or elbows.  He does not notice any acute inflammation when the pain occurs.  In the mornings he feels overall generalized stiffness throughout his body.  He denies any known tick bite in the past.  Tylenol Arthritis is helpful.  Patient reports his sister has lupus, celiac disease, and ulcerative colitis.      The following sections of the patient's chart were reviewed and are documented in the electronic chart:  Current medications, Allergies, Problem list and Past Medical History    OBJECTIVE     Visit Vitals  /70   Pulse 90   Temp 97.7 °F (36.5 °C)   Resp 17   Wt 91.4 kg (201 lb 9.8 oz)   SpO2 96%   BMI 28.93 kg/m²        Physical Exam  Constitutional:       General: He is not in acute distress.     Appearance: He is not ill-appearing.   Eyes:      Comments: Small patch of the eyelash on his left upper eyelid that are hypopigmented/white.   Musculoskeletal:      Comments: No acute inflammation or swelling of the bilateral elbows, wrists, hands, knees, or ankles.   Skin:     Comments:   - Patches of hypopigmentation on the anus, foreskin, and perianal area, consistent with likely vitiligo.  - He also has a blotchy hyperpigmented rash in the groin area that is more consistent with tinea cruris.  He reports it has been there chronically and occasionally is mildly itchy.  - He also has a small patch of mild erythema, possible psoriasis in the upper gluteal cleft.     Neurological:      Mental Status: He is alert and oriented to person, place, and time.   Psychiatric:         Mood and Affect: Mood normal.         Component      Latest Ref Rng 9/11/2023  1:02 PM   Fasting Status      0 - 999 Hours 12    Sodium      135 - 145 mmol/L 137    Potassium      3.4 - 5.1 mmol/L 4.0    Chloride      97 - 110 mmol/L 104    CO2      21 - 32 mmol/L 30    ANION GAP      7 - 19 mmol/L 7    Glucose      70 - 99 mg/dL 100 (H)    BUN      6 - 20 mg/dL 11    Creatinine      0.67 - 1.17 mg/dL 0.89    Glomerular Filtration Rate      >=60  >90    BUN/CREATININE RATIO      7 - 25  12    CALCIUM      8.4 - 10.2 mg/dL 9.8    TOTAL BILIRUBIN      0.2 - 1.0 mg/dL 0.8    AST/SGOT      <=37 Units/L 22    ALT/SGPT      <64 Units/L 48    ALK PHOSPHATASE      45 - 117 Units/L 97    Albumin      3.6 - 5.1 g/dL 4.2    TOTAL PROTEIN      6.4 - 8.2 g/dL 8.1    GLOBULIN      2.0 - 4.0 g/dL 3.9    A/G Ratio, Serum      1.0 - 2.4  1.1    CHOLESTEROL      <=199 mg/dL 186    TRIGLYCERIDE      <=149 mg/dL 144    HDL      >=40 mg/dL 40    CALCULATED LDL      <=129 mg/dL 117    CALCULATED NON HDL      mg/dL 146    CHOL/HDL      <=4.4  4.7 (H)    MAGNESIUM      1.7 - 2.4 mg/dL  2.7 (H)    GLYCOHEMOGLOBIN A1C      4.5 - 5.6 % 4.7       Legend:  (H) High      MEDICAL DECISION MAKING     1. Hyperlipidemia, mixed    2. Elevated fasting glucose    3. Hypermagnesemia    4. Raynaud's disease without gangrene    5. Iritis of both eyes    6. Generalized joint pain    7. Vitiligo    8. Rash of groin    9. Need for vaccination        Significant improvement in lipids with lifestyle modification.  Healthy diet and regular physical activity encouraged for history of hyperlipidemia and mildly elevated fasting glucose.    Continued mild hypermagnesemia.  Recheck magnesium level today and also check PTH level.  Patient denies taking any magnesium supplements.    Patient with multiple symptoms concerning for possible autoimmune disorder including chronic bilateral iritis, generalized joint pains, Raynaud's disease, and vitiligo.  Check labs today as per orders.  Discussed that lab testing for ankylosing spondylitis would be HLA B27.  However, per lab protocol, this test would require prior authorization prior to being done.  Therefore, we will hold off on having this lab done at this time, and start with the initial workup as ordered.  May eventually consider referral to rheumatology, or possibly obtaining prior authorization for HLA B27 testing if he is not referred to rheumatology.    Terbinafine cream prescribed as per orders for likely tinea cruris.  He also has a small patch of erythema in the upper gluteal cleft that is somewhat suspicious for psoriasis.  For now we will continue to monitor this.    Influenza vaccine given today.      Orders Placed This Encounter   • Influenza Quadrivalent Split Pres Free 0.5 mL Pre-filled Vacc (FluLaval, Fluzone, Fluarix; ages 6+ months - [id number]   • Magnesium   • Parathyroid Hormone Intact Without Calcium   • JESSICA Screen With Antibody And IFA Reflex   • C Reactive Protein   • Rheumatoid Factor   • Cyclic Citrullinated Peptide Antibody IgG & IgA   • Sedimentation Rate    • Lyme IgG and IgM Antibody Screen with reflex to Immunoblot   • Urinalysis With Microscopy & Culture If Indicated     Order Specific Question:   Indication:     Answer:   Symptomatic   • Uric Acid   • terbinafine (LamISIL AT) 1 % cream     Sig: Apply 1 application. topically daily. To groin rash until resolved for up to 4 weeks.     Dispense:  30 g     Refill:  1       FOLLOW UP:  Return pending lab results.       I spent 45 minutes of total time on the date of the encounter including chart review, face-to-face time with the patient, documentation, and care coordination.    no hematuria/no flank pain L/no flank pain R

## (undated) DEVICE — ELCTR BOVIE TIP BLADE INSULATED 2.8" EDGE WITH SAFETY

## (undated) DEVICE — DRAPE 1/2 SHEET 40X57"

## (undated) DEVICE — DRAPE CHEST BREAST 106" X 122"

## (undated) DEVICE — SPONGE PEANUT AUTO COUNT

## (undated) DEVICE — VENODYNE/SCD SLEEVE CALF MEDIUM

## (undated) DEVICE — NDL HYPO REGULAR BEVEL 25G X 1.5" (BLUE)

## (undated) DEVICE — LABELS BLANK W PEN

## (undated) DEVICE — PACK MAJOR ABDOMINAL WITH LAP

## (undated) DEVICE — POSITIONER FOAM EGG CRATE ULNAR 2PCS (PINK)

## (undated) DEVICE — PREP CHLORAPREP HI-LITE ORANGE 26ML

## (undated) DEVICE — SOL IRR POUR NS 0.9% 500ML

## (undated) DEVICE — LIGASURE SMALL JAW

## (undated) DEVICE — STAPLER SKIN VISI-STAT 35 WIDE

## (undated) DEVICE — DRAPE INSTRUMENT POUCH 6.75" X 11"

## (undated) DEVICE — SUT VICRYL 3-0 27" SH UNDYED

## (undated) DEVICE — WARMING BLANKET LOWER ADULT

## (undated) DEVICE — SYR LUER LOK 10CC

## (undated) DEVICE — SPECIMEN CONTAINER 100ML

## (undated) DEVICE — DRSG STERISTRIPS 0.5 X 4"

## (undated) DEVICE — BASIN SET DOUBLE

## (undated) DEVICE — ELCTR GROUNDING PAD ADULT COVIDIEN

## (undated) DEVICE — SUT MONOCRYL 4-0 27" PS-2 UNDYED

## (undated) DEVICE — DRAPE 3/4 SHEET W REINFORCEMENT 56X77"

## (undated) DEVICE — SUCTION YANKAUER NO CONTROL VENT